# Patient Record
Sex: FEMALE | Race: WHITE
[De-identification: names, ages, dates, MRNs, and addresses within clinical notes are randomized per-mention and may not be internally consistent; named-entity substitution may affect disease eponyms.]

---

## 2021-11-03 ENCOUNTER — TRANSCRIPTION ENCOUNTER (OUTPATIENT)
Age: 61
End: 2021-11-03

## 2022-07-26 PROBLEM — Z00.00 ENCOUNTER FOR PREVENTIVE HEALTH EXAMINATION: Status: ACTIVE | Noted: 2022-07-26

## 2022-08-17 ENCOUNTER — APPOINTMENT (OUTPATIENT)
Dept: ORTHOPEDIC SURGERY | Facility: CLINIC | Age: 62
End: 2022-08-17

## 2023-12-21 ENCOUNTER — APPOINTMENT (OUTPATIENT)
Dept: ORTHOPEDIC SURGERY | Facility: CLINIC | Age: 63
End: 2023-12-21
Payer: COMMERCIAL

## 2023-12-21 VITALS — WEIGHT: 220 LBS | HEIGHT: 65 IN | BODY MASS INDEX: 36.65 KG/M2

## 2023-12-21 DIAGNOSIS — I10 ESSENTIAL (PRIMARY) HYPERTENSION: ICD-10-CM

## 2023-12-21 PROCEDURE — 99203 OFFICE O/P NEW LOW 30 MIN: CPT

## 2023-12-21 NOTE — IMAGING
[de-identified] : (Exam: Shoulder)   Laterality is right    Patient is in no acute distress, alert and oriented Sensation is grossly intact to light touch in the hand Motor function is 5/5 in the hand Capillary refill is less than 2 seconds in the fingers Lymphadenopathy is not present Peripheral edema is not present   Skin is intact Swelling is present Atrophy is not present Scapular winging is not present Deformity of the AC joint is not present Deformity of the biceps is not present   Bicipital groove tenderness is present AC joint tenderness is not present Scapulothoracic tenderness is not present   Active forward elevation is 30 Passive forward elevation is 60 External rotation at the side is 20 Internal rotation behind the back is to the level of side pocket   Forward elevation strength is 3/5 External rotation strength at the side is 3/5 Internal rotation strength at the side is 3/5 Deltoid anterior, posterior and lateral heads is firing   [Right] : right shoulder [Outside films reviewed] : Outside films reviewed [FreeTextEntry1] : ER films show 3 part minimally displaced proxiaml humerus fracture, no dislocation Opt out

## 2023-12-21 NOTE — DISCUSSION/SUMMARY
[de-identified] : History, clinical examination and imaging were most consistent with: -right shoulder closed minimally displaced proximal humerus fracture   The diagnosis was explained in detail. The potential non-surgical and surgical treatments were reviewed. The relative risks and benefits of each option were considered relative to the patients age, activity level, medical history, symptom severity and previously attempted treatments.   The patient was advised to consult with their primary medical provider prior to initiation of any new medications to reduce the risk of adverse effects specific to their long-term home medications and medical history. The risk of gastrointestinal irritation and kidney injury specific to long-term NSAID use was discussed.  -Discussed the fracture pattern is within parameters for non surgical treatment. -Patient to continue sling immobilization, can remove at rest. -Discussed the risk of permanent stiffness and reduced function from the injury.  -Over the counter acetaminophen as needed for pain. -Discussed that if function was not satisfactory after a trial of conservative treatment would consider CSI and surgical options.  -Follow up in 2 weeks with XR, if stable can begin PT for PROM exercises.    (MDM)   Problem Complexity -Moderate: acute, complicated injury   Risk -Low: over the counter medication   -Patient has not been seen by another provider in my practice within the past 2 years who specializes in orthopedic surgery.

## 2023-12-21 NOTE — HISTORY OF PRESENT ILLNESS
[de-identified] : Date of initial evaluation 12/21/2023 Patient age is 63 years old Body part causing symptoms is the right shoulder Symptoms began 12/17, she fell onto the right shoulder Location of pain is anterior Quality of pain is dull Pain score at rest is 0 Pain score during activity is 7 Radicular symptoms are not present Prior treatments include Frederic ER Sling and tylenol Patient's condition is not associated with workers compensation, no-fault or interscholastic athletics

## 2024-01-05 ENCOUNTER — APPOINTMENT (OUTPATIENT)
Dept: ORTHOPEDIC SURGERY | Facility: CLINIC | Age: 64
End: 2024-01-05
Payer: COMMERCIAL

## 2024-01-05 PROCEDURE — 73030 X-RAY EXAM OF SHOULDER: CPT | Mod: RT

## 2024-01-05 PROCEDURE — 99213 OFFICE O/P EST LOW 20 MIN: CPT

## 2024-01-05 RX ORDER — METOPROLOL TARTRATE 50 MG/1
50 TABLET, FILM COATED ORAL
Refills: 0 | Status: ACTIVE | COMMUNITY

## 2024-01-05 RX ORDER — APIXABAN 5 MG/1
5 TABLET, FILM COATED ORAL
Refills: 0 | Status: ACTIVE | COMMUNITY

## 2024-01-05 RX ORDER — OLMESARTAN MEDOXOMIL 40 MG/1
40 TABLET, FILM COATED ORAL
Refills: 0 | Status: ACTIVE | COMMUNITY

## 2024-01-05 RX ORDER — AMLODIPINE BESYLATE 5 MG/1
5 TABLET ORAL
Refills: 0 | Status: ACTIVE | COMMUNITY

## 2024-01-05 RX ORDER — PROPAFENONE HYDROCHLORIDE 225 MG/1
225 TABLET, FILM COATED ORAL
Refills: 0 | Status: ACTIVE | COMMUNITY

## 2024-01-05 NOTE — DISCUSSION/SUMMARY
[de-identified] : History, clinical examination and imaging were most consistent with: -right shoulder closed minimally displaced proximal humerus fracture   The diagnosis was explained in detail. The potential non-surgical and surgical treatments were reviewed. The relative risks and benefits of each option were considered relative to the patients age, activity level, medical history, symptom severity and previously attempted treatments.   The patient was advised to consult with their primary medical provider prior to initiation of any new medications to reduce the risk of adverse effects specific to their long-term home medications and medical history. The risk of gastrointestinal irritation and kidney injury specific to long-term NSAID use was discussed.  -Discussed the fracture pattern is within parameters to continue surgical treatment. -Patient to begin formal PT for passive motion exercises. -Continue sling immobilization, remove at rest. -Discussed the risk of permanent stiffness and reduced function from the injury. Discussed that if function was not satisfactory after a trial of conservative treatment would consider CSI and surgical options.  -Over the counter acetaminophen as needed for pain. -Follow up in 4 weeks with XR, plan to discontinue sling and advance PT to active motion.  (MDM)   Problem Complexity -Moderate: acute, complicated injury

## 2024-01-05 NOTE — IMAGING
[Right] : right shoulder [de-identified] : (Exam: Shoulder)   Laterality is right    Patient is in no acute distress, alert and oriented Sensation is grossly intact to light touch in the hand Motor function is 5/5 in the hand Capillary refill is less than 2 seconds in the fingers Lymphadenopathy is not present Peripheral edema is not present   Skin is intact Swelling is present Atrophy is not present Scapular winging is not present Deformity of the AC joint is not present Deformity of the biceps is not present   Bicipital groove tenderness is present AC joint tenderness is not present Scapulothoracic tenderness is not present   Active forward elevation is 30 Passive forward elevation is 90 External rotation at the side is 20 Internal rotation behind the back is to the level of side pocket   Forward elevation strength is 3/5 External rotation strength at the side is 3/5 Internal rotation strength at the side is 3/5 Deltoid anterior, posterior and lateral heads is firing   [FreeTextEntry1] : 3 part minimally displaced proximal humerus fracture with no interval displacement, early signs of healing

## 2024-01-05 NOTE — HISTORY OF PRESENT ILLNESS
[de-identified] : 01/05/2024: Ff/u RT proximal humerus fracture. has been removing the sling at home. no pain medication. feels better than last visit.   Date of initial evaluation 12/21/2023 Patient age is 63 years old Body part causing symptoms is the right shoulder Symptoms began 12/17, she fell onto the right shoulder Location of pain is anterior Quality of pain is dull Pain score at rest is 0 Pain score during activity is 7 Radicular symptoms are not present Prior treatments include Babita ER Sling and tylenol Patient's condition is not associated with workers compensation, no-fault or interscholastic athletics

## 2024-02-02 ENCOUNTER — APPOINTMENT (OUTPATIENT)
Dept: ORTHOPEDIC SURGERY | Facility: CLINIC | Age: 64
End: 2024-02-02
Payer: COMMERCIAL

## 2024-02-02 DIAGNOSIS — Z78.9 OTHER SPECIFIED HEALTH STATUS: ICD-10-CM

## 2024-02-02 PROCEDURE — 99214 OFFICE O/P EST MOD 30 MIN: CPT

## 2024-02-02 PROCEDURE — 73030 X-RAY EXAM OF SHOULDER: CPT | Mod: RT

## 2024-02-02 RX ORDER — METHOCARBAMOL 500 MG/1
500 TABLET, FILM COATED ORAL
Qty: 30 | Refills: 1 | Status: ACTIVE | COMMUNITY
Start: 2024-02-02 | End: 1900-01-01

## 2024-02-02 NOTE — DISCUSSION/SUMMARY
[de-identified] : History, clinical examination and imaging were most consistent with: -right shoulder closed proximal humerus fracture   The diagnosis was explained in detail. The potential non-surgical and surgical treatments were reviewed. The relative risks and benefits of each option were considered relative to the patients age, activity level, medical history, symptom severity and previously attempted treatments.   The patient was advised to consult with their primary medical provider prior to initiation of any new medications to reduce the risk of adverse effects specific to their long-term home medications and medical history. The risk of gastrointestinal irritation and kidney injury specific to long-term NSAID use was discussed.  -Discussed imaging finding with the subacromial fragment, discussed risk of impingement. Given clinical improvement patient will continue with non-surgical treatment.  -Patient to continue formal PT, advance to active motion and strengthening. -Discontinue sling. -Discussed the risk of permanent stiffness and reduced function from the injury. Discussed that if function was not satisfactory after a trial of conservative treatment would consider CSI and surgical options.  -Over the counter acetaminophen as needed for pain. -Flexeril prescribed for muscle spasms.  -Follow up in 6 weeks with XR  (ADÁN)   Problem Complexity -Moderate: acute, complicated injury

## 2024-02-02 NOTE — IMAGING
[Right] : right shoulder [de-identified] : (Exam: Shoulder)   Laterality is right    Patient is in no acute distress, alert and oriented Sensation is grossly intact to light touch in the hand Motor function is 5/5 in the hand Capillary refill is less than 2 seconds in the fingers Lymphadenopathy is not present Peripheral edema is not present   Skin is intact Swelling is present Atrophy is not present Scapular winging is not present Deformity of the AC joint is not present Deformity of the biceps is not present   Bicipital groove tenderness is present AC joint tenderness is not present Scapulothoracic tenderness is present   Active forward elevation is 60 Passive forward elevation is 100 External rotation at the side is 20 Internal rotation behind the back is to the level of side pocket   Forward elevation strength is 3/5 External rotation strength at the side is 4/5 Internal rotation strength at the side is 4/5 Deltoid anterior, posterior and lateral heads is firing   [FreeTextEntry1] : proximal humerus fracture with interval healing, small fragment posteriorsuperior either callus versus displaced tuberosity fragment

## 2024-03-18 ENCOUNTER — APPOINTMENT (OUTPATIENT)
Dept: ORTHOPEDIC SURGERY | Facility: CLINIC | Age: 64
End: 2024-03-18
Payer: COMMERCIAL

## 2024-03-18 PROCEDURE — 99213 OFFICE O/P EST LOW 20 MIN: CPT

## 2024-03-18 PROCEDURE — 73030 X-RAY EXAM OF SHOULDER: CPT | Mod: RT

## 2024-03-18 NOTE — HISTORY OF PRESENT ILLNESS
[de-identified] : 03/18/2024: f/u for right shoulder. Feels better than last visit. pain is improved. still having stiffness. reports she was denied by insurance for PT. awaiting approval for more.   02/02/2024 F/u RT shoulder. pain is improved. going to PT. motion slowly improving. stopped using sling recently. patient reports spasms and soreness in her shoulder blade and upper back region.   01/05/2024: Ff/u RT proximal humerus fracture. has been removing the sling at home. no pain medication. feels better than last visit.   Date of initial evaluation 12/21/2023 Patient age is 63 years old Body part causing symptoms is the right shoulder Symptoms began 12/17, she fell onto the right shoulder Location of pain is anterior Quality of pain is dull Pain score at rest is 0 Pain score during activity is 7 Radicular symptoms are not present Prior treatments include Babita ER Sling and tylenol Patient's condition is not associated with workers compensation, no-fault or interscholastic athletics

## 2024-03-18 NOTE — IMAGING
[Right] : right shoulder [de-identified] : (Exam: Shoulder)   Laterality is right    Patient is in no acute distress, alert and oriented Sensation is grossly intact to light touch in the hand Motor function is 5/5 in the hand Capillary refill is less than 2 seconds in the fingers Lymphadenopathy is not present Peripheral edema is not present   Skin is intact Swelling is present Atrophy is not present Scapular winging is not present Deformity of the AC joint is not present Deformity of the biceps is not present   Bicipital groove tenderness is present AC joint tenderness is not present Scapulothoracic tenderness is present   Active forward elevation is 90 Passive forward elevation is 110 External rotation at the side is 30 Internal rotation behind the back is to the level of side pocket   Forward elevation strength is 4/5 External rotation strength at the side is 5-/5 Internal rotation strength at the side is 5/5 Deltoid anterior, posterior and lateral heads is firing   [FreeTextEntry1] : proximal humerus fracture is healed, posteriorsuperior either callus versus small displaced tuberosity fragment

## 2024-03-18 NOTE — DISCUSSION/SUMMARY
[de-identified] : History, clinical examination and imaging were most consistent with: -right shoulder closed proximal humerus fracture with post-traumatic adhesive capsulitis   The diagnosis was explained in detail. The potential non-surgical and surgical treatments were reviewed. The relative risks and benefits of each option were considered relative to the patients age, activity level, medical history, symptom severity and previously attempted treatments.   The patient was advised to consult with their primary medical provider prior to initiation of any new medications to reduce the risk of adverse effects specific to their long-term home medications and medical history. The risk of gastrointestinal irritation and kidney injury specific to long-term NSAID use was discussed.  -Patient has clinical improvement from last visit but has persistent stiffness. -Recommend additional PT for further stretching and strengthening. Script provided.  -Discussed risk of impingement from the subacromial fragment. However, I do not think it is the cause of her stiffness at this time. -Over the counter acetaminophen as needed for pain. -Follow up in 6 weeks with XR, consider CSI if stiffness persists.   (MDM)   Problem Complexity -Moderate: acute, complicated injury  Risk -Low: over the counter medication

## 2024-04-29 ENCOUNTER — APPOINTMENT (OUTPATIENT)
Dept: ORTHOPEDIC SURGERY | Facility: CLINIC | Age: 64
End: 2024-04-29
Payer: COMMERCIAL

## 2024-04-29 DIAGNOSIS — M75.01 ADHESIVE CAPSULITIS OF RIGHT SHOULDER: ICD-10-CM

## 2024-04-29 DIAGNOSIS — S42.201A UNSPECIFIED FRACTURE OF UPPER END OF RIGHT HUMERUS, INITIAL ENCOUNTER FOR CLOSED FRACTURE: ICD-10-CM

## 2024-04-29 PROCEDURE — 73030 X-RAY EXAM OF SHOULDER: CPT | Mod: RT

## 2024-04-29 PROCEDURE — 99214 OFFICE O/P EST MOD 30 MIN: CPT | Mod: 25

## 2024-04-29 PROCEDURE — 20610 DRAIN/INJ JOINT/BURSA W/O US: CPT | Mod: RT

## 2024-04-29 RX ORDER — METHYLPREDNISOLONE 4 MG/1
4 TABLET ORAL
Qty: 1 | Refills: 0 | Status: ACTIVE | COMMUNITY
Start: 2024-04-29 | End: 1900-01-01

## 2024-04-29 NOTE — DISCUSSION/SUMMARY
[de-identified] : History, clinical examination and imaging were most consistent with: -right shoulder closed proximal humerus fracture with post-traumatic adhesive capsulitis   The diagnosis was explained in detail. The potential non-surgical and surgical treatments were reviewed. The relative risks and benefits of each option were considered relative to the patients age, activity level, medical history, symptom severity and previously attempted treatments.   The patient was advised to consult with their primary medical provider prior to initiation of any new medications to reduce the risk of adverse effects specific to their long-term home medications and medical history. The risk of gastrointestinal irritation and kidney injury specific to long-term NSAID use was discussed.  -Patient has partial improvement from last visit but has persistent stiffness.  -Cortisone injection provided today for symptom relief. -Demonstrated home exercise program for aggressive range of motion exercises. -Discussed that if symptoms persist we would consider MRI prior to arthroscopic capsular release. Patient is hoping to avoid surgery.  -Over the counter acetaminophen as needed for pain. -Medical massage referral provided per patient request. -Discussed option for trigger point injections as alternative non-surgical tretament if symptoms persist.  -Follow up in 6 weeks, no XR, for clinical check.   (MDM)   Problem Complexity -Moderate: acute, complicated injury  Risk -Moderate: injection  (Procedure: Corticosteroid Injection)   Injection location was the: -right glenohumeral joint   Injection contents were: 40 mg of kenalog and 5 mL of 1% lidocaine   Procedure Details: -Informed consent was obtained. Discussed possible risks of corticosteroid injection including hyperglycemia, infection and skin discoloration. -Discussed that due to infection risk, an interval between injection and any future surgery is 6 weeks for arthroscopy and 3 months for arthroplasty. -Injection performed using aseptic technique. Hemostasis was confirmed. -Procedure was tolerated well with no complications.

## 2024-06-10 ENCOUNTER — APPOINTMENT (OUTPATIENT)
Dept: ORTHOPEDIC SURGERY | Facility: CLINIC | Age: 64
End: 2024-06-10

## 2024-12-19 ENCOUNTER — NON-APPOINTMENT (OUTPATIENT)
Age: 64
End: 2024-12-19

## 2025-03-21 NOTE — HISTORY OF PRESENT ILLNESS
[facility] Prior Authorization Team Request    Medication:  Nurtec 75mg TBDP  Dosing: Place 1 tablet unde the tongueb daily as needed for migraine  NDC (required for Medicaid members):[address]-84   Insurance Company:[employer]  BIN: [id number]  PCN: [id number]  Grp: [id number]  ID: [id number]  CoverMyMeds Key (if applicable):   Additional documentation:   Pharmacy Filling the Rx:  [facility]  Filling Pharmacy Phone:  [phone]  Contact:  [employer] [facility] ([id number] [phone]) please send all responses to this pool.  Pharmacy NPI (required for Medicaid members):[id number]     [de-identified] : 04/29/2024: f/u right shoulder. granted 6 more visits of PT, which have been completed. pain and motion is improved but still has stiffness with overhead. taking Tylenol prn.   03/18/2024: f/u for right shoulder. Feels better than last visit. pain is improved. still having stiffness. reports she was denied by insurance for PT. awaiting approval for more.   02/02/2024 F/u RT shoulder. pain is improved. going to PT. motion slowly improving. stopped using sling recently. patient reports spasms and soreness in her shoulder blade and upper back region.   01/05/2024: Ff/u RT proximal humerus fracture. has been removing the sling at home. no pain medication. feels better than last visit.   Date of initial evaluation 12/21/2023 Patient age is 63 years old Body part causing symptoms is the right shoulder Symptoms began 12/17, she fell onto the right shoulder Location of pain is anterior Quality of pain is dull Pain score at rest is 0 Pain score during activity is 7 Radicular symptoms are not present Prior treatments include Babita ER Sling and tylenol Patient's condition is not associated with workers compensation, no-fault or interscholastic athletics

## 2025-03-30 ENCOUNTER — NON-APPOINTMENT (OUTPATIENT)
Age: 65
End: 2025-03-30